# Patient Record
Sex: MALE | Race: WHITE | NOT HISPANIC OR LATINO | Employment: PART TIME | URBAN - METROPOLITAN AREA
[De-identification: names, ages, dates, MRNs, and addresses within clinical notes are randomized per-mention and may not be internally consistent; named-entity substitution may affect disease eponyms.]

---

## 2023-04-18 PROBLEM — M12.579 TRAUMATIC ARTHROPATHY OF ANKLE: Status: ACTIVE | Noted: 2023-04-18

## 2023-04-18 PROBLEM — K42.9 UMBILICAL HERNIA WITHOUT OBSTRUCTION AND WITHOUT GANGRENE: Status: ACTIVE | Noted: 2023-04-18

## 2023-04-18 PROBLEM — R60.9 EDEMA: Status: ACTIVE | Noted: 2023-04-18

## 2023-04-18 PROBLEM — R10.32 LEFT GROIN PAIN: Status: ACTIVE | Noted: 2023-04-18

## 2023-04-18 PROBLEM — M25.561 KNEE PAIN, RIGHT: Status: ACTIVE | Noted: 2023-04-18

## 2023-04-18 PROBLEM — R68.89 FLU-LIKE SYMPTOMS: Status: ACTIVE | Noted: 2023-04-18

## 2023-04-18 PROBLEM — M54.9 BACK PAIN: Status: ACTIVE | Noted: 2023-04-18

## 2023-04-18 PROBLEM — I82.402 ACUTE THROMBOEMBOLISM OF DEEP VEINS OF LEFT LOWER EXTREMITY (MULTI): Status: ACTIVE | Noted: 2023-04-18

## 2023-04-18 PROBLEM — I82.409 DVT (DEEP VENOUS THROMBOSIS) (MULTI): Status: ACTIVE | Noted: 2023-04-18

## 2023-04-18 PROBLEM — M17.10 PRIMARY LOCALIZED OSTEOARTHRITIS OF KNEE: Status: ACTIVE | Noted: 2023-04-18

## 2023-04-18 PROBLEM — M75.81 ROTATOR CUFF TENDONITIS, RIGHT: Status: ACTIVE | Noted: 2023-04-18

## 2023-04-18 PROBLEM — S89.90XA KNEE INJURY: Status: ACTIVE | Noted: 2023-04-18

## 2023-04-18 PROBLEM — R10.32 LEFT LOWER QUADRANT PAIN: Status: ACTIVE | Noted: 2023-04-18

## 2023-04-18 PROBLEM — M23.90 INTERNAL DERANGEMENT OF KNEE: Status: ACTIVE | Noted: 2023-04-18

## 2023-04-18 PROBLEM — J40 BRONCHITIS: Status: ACTIVE | Noted: 2023-04-18

## 2023-04-18 PROBLEM — K40.90 LEFT INGUINAL HERNIA: Status: ACTIVE | Noted: 2023-04-18

## 2023-04-18 PROBLEM — J30.9 ALLERGIC RHINITIS: Status: ACTIVE | Noted: 2023-04-18

## 2023-04-18 PROBLEM — I82.90 VENOUS THROMBOSIS: Status: ACTIVE | Noted: 2023-04-18

## 2023-04-18 PROBLEM — L30.9 ECZEMA: Status: ACTIVE | Noted: 2023-04-18

## 2023-04-18 PROBLEM — M75.21 BICEPS TENDINITIS OF RIGHT SHOULDER: Status: ACTIVE | Noted: 2023-04-18

## 2023-04-18 PROBLEM — S82.143D: Status: ACTIVE | Noted: 2023-04-18

## 2023-04-18 PROBLEM — Z98.52 STATUS POST VASECTOMY: Status: ACTIVE | Noted: 2023-04-18

## 2023-04-18 PROBLEM — H60.90 OTITIS EXTERNA: Status: ACTIVE | Noted: 2023-04-18

## 2023-04-18 PROBLEM — R29.818 SUSPECTED SLEEP APNEA: Status: ACTIVE | Noted: 2023-04-18

## 2023-04-18 PROBLEM — L40.9 PSORIASIS: Status: ACTIVE | Noted: 2023-04-18

## 2023-04-18 PROBLEM — G40.909 SEIZURE DISORDER (MULTI): Status: ACTIVE | Noted: 2023-04-18

## 2023-04-18 PROBLEM — E66.01 MORBID OBESITY (MULTI): Status: ACTIVE | Noted: 2023-04-18

## 2023-04-18 PROBLEM — M76.70 PERONEAL TENDONITIS: Status: ACTIVE | Noted: 2023-04-18

## 2023-04-18 PROBLEM — K21.9 GERD (GASTROESOPHAGEAL REFLUX DISEASE): Status: ACTIVE | Noted: 2023-04-18

## 2023-04-18 PROBLEM — S83.419A SPRAIN OF MEDIAL COLLATERAL LIGAMENT OF KNEE: Status: ACTIVE | Noted: 2023-04-18

## 2023-04-18 PROBLEM — M17.0 PRIMARY OSTEOARTHRITIS OF BOTH KNEES: Status: ACTIVE | Noted: 2023-04-18

## 2023-04-18 PROBLEM — A60.00 GENITAL HERPES: Status: ACTIVE | Noted: 2023-04-18

## 2023-04-18 PROBLEM — R21 RASH: Status: ACTIVE | Noted: 2023-04-18

## 2023-04-18 PROBLEM — M25.511 PAIN IN RIGHT SHOULDER: Status: ACTIVE | Noted: 2023-04-18

## 2023-04-18 PROBLEM — T81.30XA WOUND DEHISCENCE: Status: ACTIVE | Noted: 2023-04-18

## 2023-04-18 PROBLEM — M94.269 CHONDROMALACIA OF KNEE: Status: ACTIVE | Noted: 2023-04-18

## 2023-04-18 PROBLEM — D68.59 HYPERCOAGULATION SYNDROME (MULTI): Status: ACTIVE | Noted: 2023-04-18

## 2023-04-18 PROBLEM — M25.579 ANKLE PAIN: Status: ACTIVE | Noted: 2023-04-18

## 2024-01-08 DIAGNOSIS — K21.9 GASTROESOPHAGEAL REFLUX DISEASE, UNSPECIFIED WHETHER ESOPHAGITIS PRESENT: Primary | ICD-10-CM

## 2024-01-08 RX ORDER — PANTOPRAZOLE SODIUM 40 MG/1
40 TABLET, DELAYED RELEASE ORAL DAILY
Qty: 90 TABLET | Refills: 1 | Status: SHIPPED | OUTPATIENT
Start: 2024-01-08

## 2024-01-08 NOTE — TELEPHONE ENCOUNTER
Rx Refill Request Telephone Encounter    Name:  Ernie Porter  :  [id number]  Medication Name:   Requested Prescriptions     Pending Prescriptions Disp Refills    pantoprazole (ProtoNix) 40 mg EC tablet 90 tablet 1     Sig: Take 1 tablet (40 mg) by mouth once daily.   Specific Pharmacy location:    [facility]/pharmacy #[id number] [place] [address] AT [address] OF [address] [place] [address]  Phone: [phone] Fax: [phone]  Date of last appointment:  10/19/2022 w/   Date of next appointment:  None scheduled  Best number to reach patient:  [phone] (home) [phone] (work)

## 2024-01-16 ENCOUNTER — HOSPITAL ENCOUNTER (EMERGENCY)
Facility: HOSPITAL | Age: 39
Discharge: HOME | End: 2024-01-16
Attending: STUDENT IN AN ORGANIZED HEALTH CARE EDUCATION/TRAINING PROGRAM
Payer: COMMERCIAL

## 2024-01-16 ENCOUNTER — APPOINTMENT (OUTPATIENT)
Dept: RADIOLOGY | Facility: HOSPITAL | Age: 39
End: 2024-01-16
Payer: COMMERCIAL

## 2024-01-16 VITALS
HEIGHT: 70 IN | DIASTOLIC BLOOD PRESSURE: 81 MMHG | RESPIRATION RATE: 18 BRPM | TEMPERATURE: 97.3 F | BODY MASS INDEX: 42.95 KG/M2 | WEIGHT: 300 LBS | HEART RATE: 66 BPM | OXYGEN SATURATION: 97 % | SYSTOLIC BLOOD PRESSURE: 141 MMHG

## 2024-01-16 DIAGNOSIS — Z04.1 EXAM FOLLOWING MVC (MOTOR VEHICLE COLLISION), NO APPARENT INJURY: ICD-10-CM

## 2024-01-16 DIAGNOSIS — S16.1XXA CERVICAL STRAIN, ACUTE, INITIAL ENCOUNTER: Primary | ICD-10-CM

## 2024-01-16 PROCEDURE — 99285 EMERGENCY DEPT VISIT HI MDM: CPT

## 2024-01-16 PROCEDURE — 96372 THER/PROPH/DIAG INJ SC/IM: CPT

## 2024-01-16 PROCEDURE — 99285 EMERGENCY DEPT VISIT HI MDM: CPT | Mod: 25 | Performed by: STUDENT IN AN ORGANIZED HEALTH CARE EDUCATION/TRAINING PROGRAM

## 2024-01-16 PROCEDURE — 99284 EMERGENCY DEPT VISIT MOD MDM: CPT | Performed by: STUDENT IN AN ORGANIZED HEALTH CARE EDUCATION/TRAINING PROGRAM

## 2024-01-16 PROCEDURE — 72125 CT NECK SPINE W/O DYE: CPT | Performed by: RADIOLOGY

## 2024-01-16 PROCEDURE — 70450 CT HEAD/BRAIN W/O DYE: CPT

## 2024-01-16 PROCEDURE — 2500000004 HC RX 250 GENERAL PHARMACY W/ HCPCS (ALT 636 FOR OP/ED)

## 2024-01-16 PROCEDURE — 70450 CT HEAD/BRAIN W/O DYE: CPT | Performed by: RADIOLOGY

## 2024-01-16 PROCEDURE — 72125 CT NECK SPINE W/O DYE: CPT

## 2024-01-16 RX ORDER — ORPHENADRINE CITRATE 100 MG/1
100 TABLET, EXTENDED RELEASE ORAL 2 TIMES DAILY PRN
Qty: 14 TABLET | Refills: 0 | Status: SHIPPED | OUTPATIENT
Start: 2024-01-16

## 2024-01-16 RX ORDER — ACETAMINOPHEN 325 MG/1
650 TABLET ORAL ONCE
Status: COMPLETED | OUTPATIENT
Start: 2024-01-16 | End: 2024-01-16

## 2024-01-16 RX ORDER — ORPHENADRINE CITRATE 30 MG/ML
60 INJECTION INTRAMUSCULAR; INTRAVENOUS ONCE
Status: COMPLETED | OUTPATIENT
Start: 2024-01-16 | End: 2024-01-16

## 2024-01-16 RX ORDER — LIDOCAINE 560 MG/1
1 PATCH PERCUTANEOUS; TOPICAL; TRANSDERMAL DAILY
Status: DISCONTINUED | OUTPATIENT
Start: 2024-01-16 | End: 2024-01-16

## 2024-01-16 RX ADMIN — ACETAMINOPHEN 650 MG: 325 TABLET ORAL at 14:51

## 2024-01-16 RX ADMIN — ORPHENADRINE CITRATE 60 MG: 60 INJECTION INTRAMUSCULAR; INTRAVENOUS at 14:52

## 2024-01-16 ASSESSMENT — PAIN SCALES - GENERAL
PAINLEVEL_OUTOF10: 6
PAINLEVEL_OUTOF10: 4
PAINLEVEL_OUTOF10: 5 - MODERATE PAIN

## 2024-01-16 ASSESSMENT — COLUMBIA-SUICIDE SEVERITY RATING SCALE - C-SSRS
2. HAVE YOU ACTUALLY HAD ANY THOUGHTS OF KILLING YOURSELF?: NO
1. IN THE PAST MONTH, HAVE YOU WISHED YOU WERE DEAD OR WISHED YOU COULD GO TO SLEEP AND NOT WAKE UP?: NO
6. HAVE YOU EVER DONE ANYTHING, STARTED TO DO ANYTHING, OR PREPARED TO DO ANYTHING TO END YOUR LIFE?: NO

## 2024-01-16 ASSESSMENT — LIFESTYLE VARIABLES
REASON UNABLE TO ASSESS: NO
HAVE YOU EVER FELT YOU SHOULD CUT DOWN ON YOUR DRINKING: NO
EVER FELT BAD OR GUILTY ABOUT YOUR DRINKING: NO
EVER HAD A DRINK FIRST THING IN THE MORNING TO STEADY YOUR NERVES TO GET RID OF A HANGOVER: NO
HAVE PEOPLE ANNOYED YOU BY CRITICIZING YOUR DRINKING: NO

## 2024-01-16 ASSESSMENT — PAIN - FUNCTIONAL ASSESSMENT
PAIN_FUNCTIONAL_ASSESSMENT: 0-10
PAIN_FUNCTIONAL_ASSESSMENT: 0-10

## 2024-01-16 NOTE — DISCHARGE INSTRUCTIONS
Use the prescribed medication as it is written, use caution as it is sedating.  Please follow with your primary care physician in the next 2 to 3 days for evaluation. Return to the emergency department for any new or worsening symptoms such as change in strength sensation vision or speech, chest pain, difficulty breathing, persistent vomiting or confusion.
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
## 2024-09-13 ENCOUNTER — OFFICE VISIT (OUTPATIENT)
Dept: PRIMARY CARE | Facility: CLINIC | Age: 39
End: 2024-09-13
Payer: COMMERCIAL

## 2024-09-13 ENCOUNTER — LAB (OUTPATIENT)
Dept: LAB | Facility: LAB | Age: 39
End: 2024-09-13
Payer: COMMERCIAL

## 2024-09-13 VITALS
BODY MASS INDEX: 43.1 KG/M2 | HEART RATE: 86 BPM | RESPIRATION RATE: 16 BRPM | HEIGHT: 69 IN | SYSTOLIC BLOOD PRESSURE: 108 MMHG | OXYGEN SATURATION: 97 % | DIASTOLIC BLOOD PRESSURE: 72 MMHG | TEMPERATURE: 98.2 F | WEIGHT: 291 LBS

## 2024-09-13 DIAGNOSIS — I83.813 VARICOSE VEINS OF BOTH LOWER EXTREMITIES WITH PAIN: ICD-10-CM

## 2024-09-13 DIAGNOSIS — Z86.718 HISTORY OF DVT (DEEP VEIN THROMBOSIS): ICD-10-CM

## 2024-09-13 DIAGNOSIS — Z00.00 ANNUAL PHYSICAL EXAM: Primary | ICD-10-CM

## 2024-09-13 DIAGNOSIS — R29.818 SUSPECTED SLEEP APNEA: ICD-10-CM

## 2024-09-13 DIAGNOSIS — K21.9 GASTROESOPHAGEAL REFLUX DISEASE, UNSPECIFIED WHETHER ESOPHAGITIS PRESENT: ICD-10-CM

## 2024-09-13 DIAGNOSIS — Z00.00 ANNUAL PHYSICAL EXAM: ICD-10-CM

## 2024-09-13 PROBLEM — H60.90 OTITIS EXTERNA: Status: RESOLVED | Noted: 2023-04-18 | Resolved: 2024-09-13

## 2024-09-13 PROBLEM — M54.9 BACK PAIN: Status: RESOLVED | Noted: 2023-04-18 | Resolved: 2024-09-13

## 2024-09-13 PROBLEM — R21 RASH: Status: RESOLVED | Noted: 2023-04-18 | Resolved: 2024-09-13

## 2024-09-13 PROBLEM — K42.9 UMBILICAL HERNIA: Status: RESOLVED | Noted: 2024-09-13 | Resolved: 2024-09-13

## 2024-09-13 PROBLEM — M25.561 KNEE PAIN, RIGHT: Status: RESOLVED | Noted: 2023-04-18 | Resolved: 2024-09-13

## 2024-09-13 PROBLEM — Z98.890 HISTORY OF SURGICAL PROCEDURE: Status: RESOLVED | Noted: 2024-09-13 | Resolved: 2024-09-13

## 2024-09-13 PROBLEM — R10.32 LEFT GROIN PAIN: Status: RESOLVED | Noted: 2023-04-18 | Resolved: 2024-09-13

## 2024-09-13 PROBLEM — Z98.52 STATUS POST VASECTOMY: Status: RESOLVED | Noted: 2023-04-18 | Resolved: 2024-09-13

## 2024-09-13 PROBLEM — M17.10 PRIMARY LOCALIZED OSTEOARTHRITIS OF KNEE: Status: RESOLVED | Noted: 2023-04-18 | Resolved: 2024-09-13

## 2024-09-13 PROBLEM — S82.143D: Status: RESOLVED | Noted: 2023-04-18 | Resolved: 2024-09-13

## 2024-09-13 PROBLEM — M75.81 ROTATOR CUFF TENDONITIS, RIGHT: Status: RESOLVED | Noted: 2023-04-18 | Resolved: 2024-09-13

## 2024-09-13 PROBLEM — S89.90XA KNEE INJURY: Status: RESOLVED | Noted: 2023-04-18 | Resolved: 2024-09-13

## 2024-09-13 PROBLEM — R10.32 LEFT LOWER QUADRANT PAIN: Status: RESOLVED | Noted: 2023-04-18 | Resolved: 2024-09-13

## 2024-09-13 PROBLEM — T81.30XA WOUND DEHISCENCE: Status: RESOLVED | Noted: 2023-04-18 | Resolved: 2024-09-13

## 2024-09-13 PROBLEM — F41.9 ANXIETY: Status: ACTIVE | Noted: 2024-09-13

## 2024-09-13 PROBLEM — F32.1 CURRENT MODERATE EPISODE OF MAJOR DEPRESSIVE DISORDER (MULTI): Status: ACTIVE | Noted: 2023-10-27

## 2024-09-13 PROBLEM — M76.70 PERONEAL TENDONITIS: Status: RESOLVED | Noted: 2023-04-18 | Resolved: 2024-09-13

## 2024-09-13 PROBLEM — R68.89 FLU-LIKE SYMPTOMS: Status: RESOLVED | Noted: 2023-04-18 | Resolved: 2024-09-13

## 2024-09-13 PROBLEM — S83.419A SPRAIN OF MEDIAL COLLATERAL LIGAMENT OF KNEE: Status: RESOLVED | Noted: 2023-04-18 | Resolved: 2024-09-13

## 2024-09-13 PROBLEM — M25.579 ANKLE PAIN: Status: RESOLVED | Noted: 2023-04-18 | Resolved: 2024-09-13

## 2024-09-13 PROBLEM — I82.402 ACUTE THROMBOEMBOLISM OF DEEP VEINS OF LEFT LOWER EXTREMITY (MULTI): Status: RESOLVED | Noted: 2023-04-18 | Resolved: 2024-09-13

## 2024-09-13 PROBLEM — M25.511 PAIN IN RIGHT SHOULDER: Status: RESOLVED | Noted: 2023-04-18 | Resolved: 2024-09-13

## 2024-09-13 PROBLEM — M12.579 TRAUMATIC ARTHROPATHY OF ANKLE: Status: RESOLVED | Noted: 2023-04-18 | Resolved: 2024-09-13

## 2024-09-13 PROBLEM — J40 BRONCHITIS: Status: RESOLVED | Noted: 2023-04-18 | Resolved: 2024-09-13

## 2024-09-13 PROBLEM — I82.90 VENOUS THROMBOSIS: Status: RESOLVED | Noted: 2023-04-18 | Resolved: 2024-09-13

## 2024-09-13 PROBLEM — M75.21 BICEPS TENDINITIS OF RIGHT SHOULDER: Status: RESOLVED | Noted: 2023-04-18 | Resolved: 2024-09-13

## 2024-09-13 PROBLEM — Z98.890 HISTORY OF ANKLE SURGERY: Status: RESOLVED | Noted: 2024-09-13 | Resolved: 2024-09-13

## 2024-09-13 LAB
ALBUMIN SERPL BCP-MCNC: 4.8 G/DL (ref 3.4–5)
ALP SERPL-CCNC: 54 U/L (ref 33–120)
ALT SERPL W P-5'-P-CCNC: 33 U/L (ref 10–52)
ANION GAP SERPL CALC-SCNC: 16 MMOL/L (ref 10–20)
AST SERPL W P-5'-P-CCNC: 26 U/L (ref 9–39)
BILIRUB SERPL-MCNC: 1.3 MG/DL (ref 0–1.2)
BUN SERPL-MCNC: 23 MG/DL (ref 6–23)
CALCIUM SERPL-MCNC: 10 MG/DL (ref 8.6–10.3)
CHLORIDE SERPL-SCNC: 103 MMOL/L (ref 98–107)
CHOLEST SERPL-MCNC: 171 MG/DL (ref 0–199)
CHOLESTEROL/HDL RATIO: 3.2
CO2 SERPL-SCNC: 24 MMOL/L (ref 21–32)
CREAT SERPL-MCNC: 1.08 MG/DL (ref 0.5–1.3)
EGFRCR SERPLBLD CKD-EPI 2021: >90 ML/MIN/1.73M*2
ERYTHROCYTE [DISTWIDTH] IN BLOOD BY AUTOMATED COUNT: 13 % (ref 11.5–14.5)
GLUCOSE SERPL-MCNC: 76 MG/DL (ref 74–99)
HCT VFR BLD AUTO: 48.8 % (ref 41–52)
HDLC SERPL-MCNC: 54.2 MG/DL
HGB BLD-MCNC: 15.9 G/DL (ref 13.5–17.5)
LDLC SERPL CALC-MCNC: 104 MG/DL
MCH RBC QN AUTO: 29.2 PG (ref 26–34)
MCHC RBC AUTO-ENTMCNC: 32.6 G/DL (ref 32–36)
MCV RBC AUTO: 90 FL (ref 80–100)
NON HDL CHOLESTEROL: 117 MG/DL (ref 0–149)
NRBC BLD-RTO: 0 /100 WBCS (ref 0–0)
PLATELET # BLD AUTO: 248 X10*3/UL (ref 150–450)
POTASSIUM SERPL-SCNC: 4.5 MMOL/L (ref 3.5–5.3)
PROT SERPL-MCNC: 7.4 G/DL (ref 6.4–8.2)
RBC # BLD AUTO: 5.44 X10*6/UL (ref 4.5–5.9)
SODIUM SERPL-SCNC: 138 MMOL/L (ref 136–145)
TRIGL SERPL-MCNC: 62 MG/DL (ref 0–149)
TSH SERPL-ACNC: 0.56 MIU/L (ref 0.44–3.98)
VLDL: 12 MG/DL (ref 0–40)
WBC # BLD AUTO: 11.7 X10*3/UL (ref 4.4–11.3)

## 2024-09-13 PROCEDURE — 86803 HEPATITIS C AB TEST: CPT

## 2024-09-13 PROCEDURE — 1036F TOBACCO NON-USER: CPT

## 2024-09-13 PROCEDURE — 85027 COMPLETE CBC AUTOMATED: CPT

## 2024-09-13 PROCEDURE — 80061 LIPID PANEL: CPT

## 2024-09-13 PROCEDURE — 99395 PREV VISIT EST AGE 18-39: CPT

## 2024-09-13 PROCEDURE — 80053 COMPREHEN METABOLIC PANEL: CPT

## 2024-09-13 PROCEDURE — 84443 ASSAY THYROID STIM HORMONE: CPT

## 2024-09-13 PROCEDURE — 3008F BODY MASS INDEX DOCD: CPT

## 2024-09-13 PROCEDURE — 36415 COLL VENOUS BLD VENIPUNCTURE: CPT

## 2024-09-13 RX ORDER — PANTOPRAZOLE SODIUM 40 MG/1
40 TABLET, DELAYED RELEASE ORAL DAILY
Qty: 90 TABLET | Refills: 1 | Status: SHIPPED | OUTPATIENT
Start: 2024-09-13

## 2024-09-13 RX ORDER — PREDNISONE 20 MG/1
20 TABLET ORAL DAILY
COMMUNITY
Start: 2023-03-30 | End: 2024-09-13 | Stop reason: WASHOUT

## 2024-09-13 RX ORDER — FLUOXETINE HYDROCHLORIDE 40 MG/1
1 CAPSULE ORAL
COMMUNITY
Start: 2024-08-10

## 2024-09-13 RX ORDER — PHENTERMINE HYDROCHLORIDE 37.5 MG/1
1 TABLET ORAL
COMMUNITY
Start: 2024-03-06 | End: 2024-09-13 | Stop reason: WASHOUT

## 2024-09-13 RX ORDER — HYDROXYZINE HYDROCHLORIDE 25 MG/1
1 TABLET, FILM COATED ORAL 2 TIMES DAILY PRN
COMMUNITY
Start: 2024-06-17 | End: 2024-09-13 | Stop reason: WASHOUT

## 2024-09-13 SDOH — ECONOMIC STABILITY: INCOME INSECURITY: IN THE LAST 12 MONTHS, WAS THERE A TIME WHEN YOU WERE NOT ABLE TO PAY THE MORTGAGE OR RENT ON TIME?: NO

## 2024-09-13 SDOH — ECONOMIC STABILITY: FOOD INSECURITY: WITHIN THE PAST 12 MONTHS, THE FOOD YOU BOUGHT JUST DIDN'T LAST AND YOU DIDN'T HAVE MONEY TO GET MORE.: NEVER TRUE

## 2024-09-13 SDOH — HEALTH STABILITY: PHYSICAL HEALTH: ON AVERAGE, HOW MANY DAYS PER WEEK DO YOU ENGAGE IN MODERATE TO STRENUOUS EXERCISE (LIKE A BRISK WALK)?: 5 DAYS

## 2024-09-13 SDOH — ECONOMIC STABILITY: TRANSPORTATION INSECURITY
IN THE PAST 12 MONTHS, HAS THE LACK OF TRANSPORTATION KEPT YOU FROM MEDICAL APPOINTMENTS OR FROM GETTING MEDICATIONS?: NO

## 2024-09-13 SDOH — ECONOMIC STABILITY: TRANSPORTATION INSECURITY
IN THE PAST 12 MONTHS, HAS LACK OF TRANSPORTATION KEPT YOU FROM MEETINGS, WORK, OR FROM GETTING THINGS NEEDED FOR DAILY LIVING?: NO

## 2024-09-13 SDOH — ECONOMIC STABILITY: FOOD INSECURITY: WITHIN THE PAST 12 MONTHS, YOU WORRIED THAT YOUR FOOD WOULD RUN OUT BEFORE YOU GOT MONEY TO BUY MORE.: NEVER TRUE

## 2024-09-13 SDOH — HEALTH STABILITY: PHYSICAL HEALTH: ON AVERAGE, HOW MANY MINUTES DO YOU ENGAGE IN EXERCISE AT THIS LEVEL?: 60 MIN

## 2024-09-13 ASSESSMENT — LIFESTYLE VARIABLES
HOW OFTEN DO YOU HAVE A DRINK CONTAINING ALCOHOL: MONTHLY OR LESS
SKIP TO QUESTIONS 9-10: 1
HOW OFTEN DO YOU HAVE SIX OR MORE DRINKS ON ONE OCCASION: NEVER
HOW MANY STANDARD DRINKS CONTAINING ALCOHOL DO YOU HAVE ON A TYPICAL DAY: 1 OR 2
AUDIT-C TOTAL SCORE: 1

## 2024-09-13 ASSESSMENT — SOCIAL DETERMINANTS OF HEALTH (SDOH)
HOW OFTEN DO YOU GET TOGETHER WITH FRIENDS OR RELATIVES?: ONCE A WEEK
WITHIN THE LAST YEAR, HAVE YOU BEEN AFRAID OF YOUR PARTNER OR EX-PARTNER?: NO
HOW OFTEN DO YOU ATTEND CHURCH OR RELIGIOUS SERVICES?: NEVER
DO YOU BELONG TO ANY CLUBS OR ORGANIZATIONS SUCH AS CHURCH GROUPS UNIONS, FRATERNAL OR ATHLETIC GROUPS, OR SCHOOL GROUPS?: NO
IN THE PAST 12 MONTHS, HAS THE ELECTRIC, GAS, OIL, OR WATER COMPANY THREATENED TO SHUT OFF SERVICE IN YOUR HOME?: NO
HOW HARD IS IT FOR YOU TO PAY FOR THE VERY BASICS LIKE FOOD, HOUSING, MEDICAL CARE, AND HEATING?: NOT VERY HARD
HOW OFTEN DO YOU ATTENT MEETINGS OF THE CLUB OR ORGANIZATION YOU BELONG TO?: NEVER
WITHIN THE LAST YEAR, HAVE YOU BEEN KICKED, HIT, SLAPPED, OR OTHERWISE PHYSICALLY HURT BY YOUR PARTNER OR EX-PARTNER?: NO
IN A TYPICAL WEEK, HOW MANY TIMES DO YOU TALK ON THE PHONE WITH FAMILY, FRIENDS, OR NEIGHBORS?: MORE THAN THREE TIMES A WEEK
WITHIN THE LAST YEAR, HAVE TO BEEN RAPED OR FORCED TO HAVE ANY KIND OF SEXUAL ACTIVITY BY YOUR PARTNER OR EX-PARTNER?: NO
WITHIN THE LAST YEAR, HAVE YOU BEEN HUMILIATED OR EMOTIONALLY ABUSED IN OTHER WAYS BY YOUR PARTNER OR EX-PARTNER?: NO

## 2024-09-13 ASSESSMENT — PATIENT HEALTH QUESTIONNAIRE - PHQ9
2. FEELING DOWN, DEPRESSED OR HOPELESS: NOT AT ALL
1. LITTLE INTEREST OR PLEASURE IN DOING THINGS: NOT AT ALL
SUM OF ALL RESPONSES TO PHQ9 QUESTIONS 1 & 2: 0

## 2024-09-13 NOTE — ASSESSMENT & PLAN NOTE
Stable.  Refill sent.  Orders:    pantoprazole (ProtoNix) 40 mg EC tablet; Take 1 tablet (40 mg) by mouth once daily.

## 2024-09-13 NOTE — PROGRESS NOTES
I reviewed with the resident the medical history and the resident’s findings on physical examination.  I discussed with the resident the patient’s diagnosis and concur with the treatment plan as documented in the resident note.     Brian Guerra, DO

## 2024-09-13 NOTE — PROGRESS NOTES
Subjective   Ernie Porter is a 39 y.o. male who presents for Annual Exam.  Here to establish care.     Needs refills.     Is on xarelto but has been out for a month.     Gerd: doing well on pantoprazole 20mg.     Anxiety: Sees psych. Doing well on prozac 40mg.     Fatigue: endorses snoring, wife says he stops breathing, poorly rested throughout the day.    Varicose veins: He endorses he has bilateral varicose veins that do cause pain from time to time.  He denies thrombophlebitis episodes.  He states they have been there for couple years now.  He states that his mom has had stripping procedure for bilateral varicose veins as well.        MedHx: Reviewed and in chart    Meds: Reviewed and in chart    SurgHx: Reviewed and in chart    FamHx: Reviewed and in chart    SocHx: Lives at home with wife and 5 kids s  Substances: marijuana consistently, no other illicit drugs.   Diet: Paleo diet, losing weight right now. Down 15lbs  Exercise: Goes to the gym regularly with lifting and cardio      Active Problem List  Patient Active Problem List   Diagnosis    Acute thromboembolism of deep veins of left lower extremity (Multi)    DVT (deep venous thrombosis) (Multi)    Venous thrombosis    Allergic rhinitis    Ankle pain    Back pain    Biceps tendinitis of right shoulder    Peroneal tendonitis    Bronchitis    Chondromalacia of knee    Internal derangement of knee    Primary localized osteoarthritis of knee    Closed fracture of tibial plateau with routine healing, subsequent encounter    Eczema    Edema    Flu-like symptoms    Genital herpes    GERD (gastroesophageal reflux disease)    Hypercoagulation syndrome (Multi)    Knee injury    Primary osteoarthritis of both knees    Knee pain, right    Left groin pain    Left inguinal hernia    Left lower quadrant pain    Morbid obesity (Multi)    Otitis externa    Pain in right shoulder    Psoriasis    Rash    Rotator cuff tendonitis, right    Seizure disorder (Multi)    Sprain  "of medial collateral ligament of knee    Status post vasectomy    Suspected sleep apnea    Traumatic arthropathy of ankle    Umbilical hernia without obstruction and without gangrene    Wound dehiscence       Comprehensive Medical/Surgical/Family History  Past Medical History:   Diagnosis Date    Other specified health status 06/14/2016    Known health problems: none     Past Surgical History:   Procedure Laterality Date    HERNIA REPAIR  06/14/2016    Inguinal Hernia Repair    LEG SURGERY  07/02/2015    Treatment Of Lower Leg Fracture       Social History  Social History     Social History Narrative    Not on file       Allergies and Medications  Patient has no known allergies.  Current Outpatient Medications on File Prior to Visit   Medication Sig Dispense Refill    pantoprazole (ProtoNix) 40 mg EC tablet Take 1 tablet (40 mg) by mouth once daily. 90 tablet 1    Dupixent Pen 300 mg/2 mL injection       loratadine (Claritin) 10 mg tablet Take 1 tablet (10 mg) by mouth once daily.      neomycin-polymyxin-HC (Cortisporin) 3.5-10,000-1 mg/mL-unit/mL-% otic suspension Administer into affected ear(s) 4 times a day.      orphenadrine (Norflex) 100 mg 12 hr tablet Take 1 tablet (100 mg) by mouth 2 times a day as needed for muscle spasms for up to 14 doses. Do not crush, chew, or split. Don't combine with other sedating medications. Don't drive, operate heavy machinery until you know how this medication makes you feel. (Patient not taking: Reported on 9/13/2024) 14 tablet 0    rivaroxaban (Xarelto) 20 mg tablet Take 1 tablet (20 mg) by mouth once daily.       No current facility-administered medications on file prior to visit.         /72 (BP Location: Right arm, Patient Position: Sitting, BP Cuff Size: Large adult)   Pulse 86   Temp 36.8 °C (98.2 °F)   Resp 16   Ht 1.753 m (5' 9\")   Wt 132 kg (291 lb)   SpO2 97%   BMI 42.97 kg/m²   Objective   Physical Exam  Constitutional:       General: He is not in acute " distress.     Appearance: Normal appearance. He is not ill-appearing.   HENT:      Head: Normocephalic and atraumatic.      Right Ear: Tympanic membrane and ear canal normal. There is no impacted cerumen.      Left Ear: Tympanic membrane and ear canal normal. There is no impacted cerumen.      Mouth/Throat:      Mouth: Mucous membranes are moist.      Pharynx: Oropharynx is clear.      Comments: Mallampati grade II  Eyes:      Extraocular Movements: Extraocular movements intact.      Pupils: Pupils are equal, round, and reactive to light.   Cardiovascular:      Rate and Rhythm: Normal rate and regular rhythm.      Pulses:           Posterior tibial pulses are 2+ on the right side and 2+ on the left side.      Heart sounds: Normal heart sounds.      Comments: B/l LE varicose veins, most visibly prominent on the R posterior calf   Pulmonary:      Effort: Pulmonary effort is normal. No respiratory distress.      Breath sounds: Normal breath sounds.   Musculoskeletal:         General: Normal range of motion.      Cervical back: Normal range of motion and neck supple.      Right lower leg: No edema.      Left lower leg: No edema.   Lymphadenopathy:      Cervical: No cervical adenopathy.   Skin:     General: Skin is warm and dry.   Neurological:      General: No focal deficit present.      Mental Status: He is alert. Mental status is at baseline.   Psychiatric:         Mood and Affect: Mood normal.         Thought Content: Thought content normal.         Assessment & Plan  Annual physical exam  Here to establish care.  Comprehensive history reviewed as above.  Will obtain labs as ordered below.  Orders:    Comprehensive Metabolic Panel; Future    CBC; Future    TSH with reflex to Free T4 if abnormal; Future    Lipid Panel; Future    Hepatitis C antibody; Future    History of DVT (deep vein thrombosis)  I personally reviewed outside notes from hematology/oncology.  It appears that he had his first DVT in 2020 and was  started on DOAC.  He then had another DVT in 2022 and was recommended to be on long-term DOAC therapy with no clear end date.  He had some hypercoagulable workup as well which appears to be negative.  He has not since followed up with hematology oncology.  Given this history, combined with the fact that he has varicose veins present on exam today that are causing pain, I do believe he would be best evaluated by vascular medicine for both issues.  I have sent refills for Xarelto in the interim.  Orders:    rivaroxaban (Xarelto) 20 mg tablet; Take 1 tablet (20 mg) by mouth once daily.    Referral to Vascular Medicine; Future    Gastroesophageal reflux disease, unspecified whether esophagitis present  Stable.  Refill sent.  Orders:    pantoprazole (ProtoNix) 40 mg EC tablet; Take 1 tablet (40 mg) by mouth once daily.    Varicose veins of both lower extremities with pain  See above.  He does have bilateral varicose veins that are present.  They are causing pain.  I think that vascular medicine should evaluate him for potential treatment modalities such as ablation or stripping.  This may or may not be contributory to his history of DVTs as well.  Orders:    rivaroxaban (Xarelto) 20 mg tablet; Take 1 tablet (20 mg) by mouth once daily.    Referral to Vascular Medicine; Future    Suspected sleep apnea  STOP-BANG today is 5.  He is high risk for sleep apnea.  Sleep study ordered.  Orders:    Home sleep apnea test (HSAT); Future      Follow-up in 1 year or sooner as needed.  Will consider sooner follow-up if any abnormalities are noted on the ordered lab work.    Jeffrey Rosales D.O.   Family Medicine PGY-2, [facility]  09/13/24

## 2024-09-13 NOTE — PATIENT INSTRUCTIONS
It was nice seeing you in the office today.  Sign up for [employer] to get results instantly.  Obtain labs/imaging as discussed during our visit.   Follow up 1 year or sooner as needed.   Call the office: [phone] for questions or concerns.  Please allow 48-72 hours for medication refills.    Jeffrey Rosales D.O.   Family Medicine PGY-2, [facility]  09/13/24

## 2024-09-13 NOTE — ASSESSMENT & PLAN NOTE
STOP-BANG today is 5.  He is high risk for sleep apnea.  Sleep study ordered.  Orders:    Home sleep apnea test (HSAT); Future

## 2024-09-14 LAB — HCV AB SER QL: NONREACTIVE

## 2024-10-17 ENCOUNTER — CLINICAL SUPPORT (OUTPATIENT)
Dept: SLEEP MEDICINE | Facility: CLINIC | Age: 39
End: 2024-10-17
Payer: COMMERCIAL

## 2024-10-17 DIAGNOSIS — R29.818 SUSPECTED SLEEP APNEA: ICD-10-CM

## 2024-10-18 NOTE — PROGRESS NOTES
Type of Study: HOME SLEEP STUDY - NOMAD     The patient received equipment and instructions for use of the [employer] [facility] Nomad HSAT device. The patient was instructed how to apply the effort belts, cannula, thermistor. It was also explained how the Nomad and oximeter components work.  The patient was asked to record their sleep for an 8-hour period.     The patient was informed of their responsibility for the device and acknowledged this by signing the HSAT device contract. The patient was asked to return the device on 10/18/2024 between the hours of 06:30 am -06:30 pm to the [facility].     The patient was instructed to call 911 as usual for any medical- emergencies while at home.  The patient was also given a phone number for troubleshooting when using the device in case there were additional questions.

## 2024-10-19 ENCOUNTER — APPOINTMENT (OUTPATIENT)
Dept: RADIOLOGY | Facility: HOSPITAL | Age: 39
End: 2024-10-19
Payer: COMMERCIAL

## 2024-10-19 ENCOUNTER — HOSPITAL ENCOUNTER (EMERGENCY)
Facility: HOSPITAL | Age: 39
Discharge: HOME | End: 2024-10-19
Attending: STUDENT IN AN ORGANIZED HEALTH CARE EDUCATION/TRAINING PROGRAM
Payer: COMMERCIAL

## 2024-10-19 VITALS
TEMPERATURE: 96.6 F | SYSTOLIC BLOOD PRESSURE: 114 MMHG | HEART RATE: 72 BPM | BODY MASS INDEX: 40.8 KG/M2 | DIASTOLIC BLOOD PRESSURE: 63 MMHG | HEIGHT: 70 IN | OXYGEN SATURATION: 99 % | RESPIRATION RATE: 18 BRPM | WEIGHT: 285 LBS

## 2024-10-19 DIAGNOSIS — M54.9 BACK PAIN, UNSPECIFIED BACK LOCATION, UNSPECIFIED BACK PAIN LATERALITY, UNSPECIFIED CHRONICITY: Primary | ICD-10-CM

## 2024-10-19 LAB
ALBUMIN SERPL BCP-MCNC: 4 G/DL (ref 3.4–5)
ALP SERPL-CCNC: 52 U/L (ref 33–120)
ALT SERPL W P-5'-P-CCNC: 23 U/L (ref 10–52)
ANION GAP SERPL CALC-SCNC: 11 MMOL/L (ref 10–20)
APPEARANCE UR: CLEAR
AST SERPL W P-5'-P-CCNC: 16 U/L (ref 9–39)
BASOPHILS # BLD AUTO: 0.08 X10*3/UL (ref 0–0.1)
BASOPHILS NFR BLD AUTO: 0.7 %
BILIRUB SERPL-MCNC: 0.7 MG/DL (ref 0–1.2)
BILIRUB UR STRIP.AUTO-MCNC: NEGATIVE MG/DL
BUN SERPL-MCNC: 18 MG/DL (ref 6–23)
CALCIUM SERPL-MCNC: 8.9 MG/DL (ref 8.6–10.3)
CHLORIDE SERPL-SCNC: 106 MMOL/L (ref 98–107)
CO2 SERPL-SCNC: 27 MMOL/L (ref 21–32)
COLOR UR: ABNORMAL
CREAT SERPL-MCNC: 0.96 MG/DL (ref 0.5–1.3)
EGFRCR SERPLBLD CKD-EPI 2021: >90 ML/MIN/1.73M*2
EOSINOPHIL # BLD AUTO: 0.34 X10*3/UL (ref 0–0.7)
EOSINOPHIL NFR BLD AUTO: 2.9 %
ERYTHROCYTE [DISTWIDTH] IN BLOOD BY AUTOMATED COUNT: 12.6 % (ref 11.5–14.5)
GLUCOSE SERPL-MCNC: 102 MG/DL (ref 74–99)
GLUCOSE UR STRIP.AUTO-MCNC: NORMAL MG/DL
HCT VFR BLD AUTO: 45.6 % (ref 41–52)
HGB BLD-MCNC: 14.9 G/DL (ref 13.5–17.5)
HOLD SPECIMEN: NORMAL
HOLD SPECIMEN: NORMAL
IMM GRANULOCYTES # BLD AUTO: 0.04 X10*3/UL (ref 0–0.7)
IMM GRANULOCYTES NFR BLD AUTO: 0.3 % (ref 0–0.9)
KETONES UR STRIP.AUTO-MCNC: NEGATIVE MG/DL
LEUKOCYTE ESTERASE UR QL STRIP.AUTO: NEGATIVE
LIPASE SERPL-CCNC: 30 U/L (ref 9–82)
LYMPHOCYTES # BLD AUTO: 3.7 X10*3/UL (ref 1.2–4.8)
LYMPHOCYTES NFR BLD AUTO: 31.5 %
MCH RBC QN AUTO: 29.2 PG (ref 26–34)
MCHC RBC AUTO-ENTMCNC: 32.7 G/DL (ref 32–36)
MCV RBC AUTO: 89 FL (ref 80–100)
MONOCYTES # BLD AUTO: 0.78 X10*3/UL (ref 0.1–1)
MONOCYTES NFR BLD AUTO: 6.6 %
NEUTROPHILS # BLD AUTO: 6.82 X10*3/UL (ref 1.2–7.7)
NEUTROPHILS NFR BLD AUTO: 58 %
NITRITE UR QL STRIP.AUTO: NEGATIVE
NRBC BLD-RTO: 0 /100 WBCS (ref 0–0)
PH UR STRIP.AUTO: 6.5 [PH]
PLATELET # BLD AUTO: 216 X10*3/UL (ref 150–450)
POTASSIUM SERPL-SCNC: 4 MMOL/L (ref 3.5–5.3)
PROT SERPL-MCNC: 6.7 G/DL (ref 6.4–8.2)
PROT UR STRIP.AUTO-MCNC: NEGATIVE MG/DL
RBC # BLD AUTO: 5.1 X10*6/UL (ref 4.5–5.9)
RBC # UR STRIP.AUTO: NEGATIVE /UL
SARS-COV-2 RNA RESP QL NAA+PROBE: NOT DETECTED
SODIUM SERPL-SCNC: 140 MMOL/L (ref 136–145)
SP GR UR STRIP.AUTO: 1.03
UROBILINOGEN UR STRIP.AUTO-MCNC: ABNORMAL MG/DL
WBC # BLD AUTO: 11.8 X10*3/UL (ref 4.4–11.3)

## 2024-10-19 PROCEDURE — 2550000001 HC RX 255 CONTRASTS: Performed by: STUDENT IN AN ORGANIZED HEALTH CARE EDUCATION/TRAINING PROGRAM

## 2024-10-19 PROCEDURE — 99284 EMERGENCY DEPT VISIT MOD MDM: CPT | Mod: 25

## 2024-10-19 PROCEDURE — 81003 URINALYSIS AUTO W/O SCOPE: CPT

## 2024-10-19 PROCEDURE — 36415 COLL VENOUS BLD VENIPUNCTURE: CPT

## 2024-10-19 PROCEDURE — 96374 THER/PROPH/DIAG INJ IV PUSH: CPT | Mod: 59

## 2024-10-19 PROCEDURE — 99285 EMERGENCY DEPT VISIT HI MDM: CPT | Performed by: STUDENT IN AN ORGANIZED HEALTH CARE EDUCATION/TRAINING PROGRAM

## 2024-10-19 PROCEDURE — 80053 COMPREHEN METABOLIC PANEL: CPT

## 2024-10-19 PROCEDURE — 2500000004 HC RX 250 GENERAL PHARMACY W/ HCPCS (ALT 636 FOR OP/ED)

## 2024-10-19 PROCEDURE — 87635 SARS-COV-2 COVID-19 AMP PRB: CPT

## 2024-10-19 PROCEDURE — 85025 COMPLETE CBC W/AUTO DIFF WBC: CPT

## 2024-10-19 PROCEDURE — 83690 ASSAY OF LIPASE: CPT

## 2024-10-19 PROCEDURE — 74177 CT ABD & PELVIS W/CONTRAST: CPT | Performed by: RADIOLOGY

## 2024-10-19 PROCEDURE — 74177 CT ABD & PELVIS W/CONTRAST: CPT

## 2024-10-19 RX ORDER — LIDOCAINE 50 MG/G
1 PATCH TOPICAL DAILY
Qty: 10 PATCH | Refills: 0 | Status: SHIPPED | OUTPATIENT
Start: 2024-10-19

## 2024-10-19 RX ORDER — KETOROLAC TROMETHAMINE 15 MG/ML
15 INJECTION, SOLUTION INTRAMUSCULAR; INTRAVENOUS ONCE
Status: COMPLETED | OUTPATIENT
Start: 2024-10-19 | End: 2024-10-19

## 2024-10-19 RX ORDER — METHOCARBAMOL 500 MG/1
500 TABLET, FILM COATED ORAL 2 TIMES DAILY
Qty: 20 TABLET | Refills: 0 | Status: SHIPPED | OUTPATIENT
Start: 2024-10-19 | End: 2024-10-29

## 2024-10-19 RX ADMIN — KETOROLAC TROMETHAMINE 15 MG: 15 INJECTION, SOLUTION INTRAMUSCULAR; INTRAVENOUS at 21:24

## 2024-10-19 RX ADMIN — IOHEXOL 75 ML: 350 INJECTION, SOLUTION INTRAVENOUS at 21:45

## 2024-10-19 ASSESSMENT — PAIN DESCRIPTION - LOCATION: LOCATION: BACK

## 2024-10-19 ASSESSMENT — LIFESTYLE VARIABLES
HAVE PEOPLE ANNOYED YOU BY CRITICIZING YOUR DRINKING: NO
EVER HAD A DRINK FIRST THING IN THE MORNING TO STEADY YOUR NERVES TO GET RID OF A HANGOVER: NO
HAVE YOU EVER FELT YOU SHOULD CUT DOWN ON YOUR DRINKING: NO
TOTAL SCORE: 0
EVER FELT BAD OR GUILTY ABOUT YOUR DRINKING: NO

## 2024-10-19 ASSESSMENT — PAIN SCALES - GENERAL
PAINLEVEL_OUTOF10: 2
PAINLEVEL_OUTOF10: 7

## 2024-10-19 ASSESSMENT — PAIN - FUNCTIONAL ASSESSMENT
PAIN_FUNCTIONAL_ASSESSMENT: 0-10
PAIN_FUNCTIONAL_ASSESSMENT: 0-10

## 2024-10-19 ASSESSMENT — PAIN DESCRIPTION - ORIENTATION: ORIENTATION: LOWER

## 2024-10-19 ASSESSMENT — PAIN DESCRIPTION - PAIN TYPE: TYPE: ACUTE PAIN

## 2024-10-20 LAB — HOLD SPECIMEN: NORMAL

## 2024-10-20 NOTE — ED PROVIDER NOTES
Emergency Department Provider Note        History of Present Illness     History provided by: Patient and Significant Other  Limitations to History: None  External Records Reviewed with Brief Summary:  Medical chart review    HPI:  Ernie Porter is a 39 y.o. male who arrives to the emergency department with a chief complaint of lower back pain for the last 5 days.  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  However the patient states he has not had a traumatic event to cause this pain.  He additionally states the pain began on his left flank and has started radiating into his left abdomen.  He states he also has bilateral back pain.  He is accompanied by his significant other who confirms the story and states that he is on Eliquis for history of DVTs Protonix for GERD.  Patient denies surgical history, states he does use daily marijuana, denies alcohol or tobacco use.    Physical Exam   Triage vitals:  T 35.9 °C (96.6 °F)  HR 83  /69  RR 20  O2 97 % None (Room air)    General: Awake, alert, in no acute distress  Eyes: Gaze conjugate.  No scleral icterus or injection  HENT: Normo-cephalic, atraumatic. No stridor  CV: Regular rate, regular rhythm. Radial pulses 2+ bilaterally  Resp: Breathing non-labored, speaking in full sentences.  Clear to auscultation bilaterally  GI: Soft, non-distended, non-tender, very mild tenderness on strong palpation of left costovertebral angle. No rebound or guarding.  : Deferred  MSK/Extremities: No gross bony deformities. Moving all extremities.  Mild tenderness to palpation paravertebrally.  Skin: Warm. Appropriate color  Neuro: Alert. Oriented. Face symmetric. Speech is fluent.  Gross strength and sensation intact in b/l UE and LEs  Psych: Appropriate mood and affect    Medical Decision Making & ED Course   Medical Decision Makin y.o. male to the emergency department with a chief complaint  of lower back pain for the last 5 days including the left lower flank pain radiating to the inguinal region with no hematuria no dysuria.  Patient denies nausea or vomiting.  Lower back pain sciatica/lumbago is high on differential however we will rule out renal or nephrolithiasis or diverticulosis or diverticulitis first, patient given Toradol, CBC, CMP, lipase, CT abdomen pelvis, urinalysis ordered for evaluation.  Please see ED course for further medical decision making.  ----      Differential diagnoses considered include but are not limited to: Lumbar go, lower back strain, renal lithiasis, urinary tract infection, COVID, ureterolithiasis, bladder stone     Social Determinants of Health which Significantly Impact Care: None identified   Independent Result Review and Interpretation: Relevant laboratory and radiographic results were reviewed and independently interpreted by myself.  As necessary, they are commented on in the ED Course.    Chronic conditions affecting the patient's care: As documented above in [facility]    The patient was discussed with the following consultants/services: None    Care Considerations: As documented above in [facility]    ED Course:  ED Course as of 10/21/24 1725   Sat Oct 19, 2024   2018 Patient has left-sided flank pain radiating to the left abdomen and left inguinal region.  CT of the abdomen pelvis with IV contrast, CBC CMP lipase urinalysis ordered for evaluation rule out renal lithiasis.  Also on differential is lumbar go and musculoskeletal pain however we can address these after treating and evaluating for potential ureterolithiasis.  Patient denies nausea or vomiting, Zofran withheld.  Toradol given for pain management CT abdomen pelvis ordered to rule out renal lithiasis ureterolithiasis, acute intra-abdominal or pelvic anatomical pathology or costovertebral angle pathology. [PV]   2026 Genitourinary exam performed with nurse Hdz at bedside.  Normal bilateral descended testicles  with nontender examination.  Normal cremasteric reflex.  Normal circumcised penis with no lesions or discharge.  No inguinal lymphadenopathy or hernia. [TL]   2327 CT abdomen pelvis with no acute abdominal or pelvic process.  Cholelithiasis and nonspecific gallbladder wall thickening, gallbladder nondistended no significant pericholecystic inflammatory changes.  Cholecystitis is unlikely. [CL]      ED Course User Index  [CL] Luis Coy DO  [PV] Julio Brown DO  [TL] Cl Ortiz DO         Diagnoses as of 10/21/24 1725   Back pain, unspecified back location, unspecified back pain laterality, unspecified chronicity     Disposition   Patient was signed out to Dr. Coy at 2100 pending completion of their work-up.  Please see the next provider's transition of care note for the remainder of the patient's care.     Procedures   Procedures    Patient seen and discussed with ED attending physician.    Julio Brown DO  Emergency Medicine     Julio Brown DO  Resident  10/19/24 2023       Julio Brown DO  Resident  10/19/24 2032    I performed a history and physical examination of the patient and discussed his management with the resident physician.  I agree with the history, physical, assessment, and plan of care, with the following exceptions:   Patient CT imaging did not reveal any sign of acute intra-abdominal retroperitoneal or pelvic pathology.  Suspect lumbar strain.  He is neurologically intact with no sign of trauma, bowel or bladder incontinence, saddle anesthesia or lower extremity weakness numbness or tingling.  Cranial nerves II through XII intact.  Strength 5 out of 5 in all 4 extremities.  Sensation intact to light touch in all 4 extremities.  No dysdiadochokinesia, no dysmetria.  Gait is narrow and stable.  Patient advised on supportive measures at home including heat pack, use of NSAIDs, Tylenol.  Patient advised to follow-up with his PCP as soon as possible but ideally within the next  72 hours for repeat evaluation.  Prescriptions given for lidocaine patch and Robaxin twice daily dosing.  May utilize this as needed.  Advised of any heavy lifting until cleared by PCP.  Discharged in stable condition with return precautions explained at bedside.    I was present for key and critical portions of the following procedures: None  Time Spent in Critical Care of the patient: None  Time spent in discussions with the patient and family: 30    DO Cl Weston DO  10/21/24 1597

## 2024-10-20 NOTE — DISCHARGE INSTRUCTIONS
We have given you prescriptions for lidocaine patches as well as Robaxin to help with your symptoms at home.  It is very important that you follow-up with your primary care provider for ongoing management of your pain and symptoms.  Please do not hesitate to return to the ER or seek immediate medical attention if you begin to have any of the following symptoms: Numbness in your groin, numbness and weakness in your lower extremities, inability to control your lower extremities, loss of control of your bladder or bowels, or any new or concerning symptoms.

## 2025-01-13 ENCOUNTER — OFFICE VISIT (OUTPATIENT)
Dept: CARDIOLOGY | Facility: CLINIC | Age: 40
End: 2025-01-13
Payer: COMMERCIAL

## 2025-01-13 VITALS
DIASTOLIC BLOOD PRESSURE: 81 MMHG | BODY MASS INDEX: 43.02 KG/M2 | WEIGHT: 300.5 LBS | HEART RATE: 72 BPM | HEIGHT: 70 IN | SYSTOLIC BLOOD PRESSURE: 123 MMHG | OXYGEN SATURATION: 96 %

## 2025-01-13 DIAGNOSIS — Z86.718 HISTORY OF DVT (DEEP VEIN THROMBOSIS): ICD-10-CM

## 2025-01-13 DIAGNOSIS — I83.813 VARICOSE VEINS OF BOTH LOWER EXTREMITIES WITH PAIN: ICD-10-CM

## 2025-01-13 DIAGNOSIS — D72.829 LEUKOCYTOSIS, UNSPECIFIED TYPE: ICD-10-CM

## 2025-01-13 DIAGNOSIS — I87.2 CHRONIC VENOUS INSUFFICIENCY: Primary | ICD-10-CM

## 2025-01-13 PROCEDURE — 1036F TOBACCO NON-USER: CPT | Performed by: INTERNAL MEDICINE

## 2025-01-13 PROCEDURE — 3008F BODY MASS INDEX DOCD: CPT | Performed by: INTERNAL MEDICINE

## 2025-01-13 PROCEDURE — 99214 OFFICE O/P EST MOD 30 MIN: CPT | Mod: GC | Performed by: INTERNAL MEDICINE

## 2025-01-13 PROCEDURE — 99204 OFFICE O/P NEW MOD 45 MIN: CPT | Performed by: INTERNAL MEDICINE

## 2025-01-13 ASSESSMENT — ENCOUNTER SYMPTOMS
LOSS OF SENSATION IN FEET: 0
DEPRESSION: 0
OCCASIONAL FEELINGS OF UNSTEADINESS: 0

## 2025-01-13 ASSESSMENT — PATIENT HEALTH QUESTIONNAIRE - PHQ9
2. FEELING DOWN, DEPRESSED OR HOPELESS: NOT AT ALL
SUM OF ALL RESPONSES TO PHQ9 QUESTIONS 1 AND 2: 0
1. LITTLE INTEREST OR PLEASURE IN DOING THINGS: NOT AT ALL

## 2025-01-13 ASSESSMENT — PAIN SCALES - GENERAL: PAINLEVEL_OUTOF10: 0-NO PAIN

## 2025-01-13 ASSESSMENT — COLUMBIA-SUICIDE SEVERITY RATING SCALE - C-SSRS
1. IN THE PAST MONTH, HAVE YOU WISHED YOU WERE DEAD OR WISHED YOU COULD GO TO SLEEP AND NOT WAKE UP?: NO
6. HAVE YOU EVER DONE ANYTHING, STARTED TO DO ANYTHING, OR PREPARED TO DO ANYTHING TO END YOUR LIFE?: NO
2. HAVE YOU ACTUALLY HAD ANY THOUGHTS OF KILLING YOURSELF?: NO

## 2025-01-13 NOTE — PATIENT INSTRUCTIONS
-- Use compression stocking during the day, do not use the same pair for more than 4-6 months   -- Meticulous skin care:              Wash daily with mild soap and warm water              Daily use of emollient moisturizer (ex. Dori, Aquaphor, Eucerin)  -- Legs should be elevated whenever you are sleeping/lying in bed or sitting with 1-2 pillows below the legs in order to try to keep them above the level of heart  -- Exercise encouraged (at least 30 mins, 5 times a week, cyclic dependent thrusting of legs recommended to activate calf muscle pump)  -- Please try to follow a low salt diet  -- Weight loss highly encouraged  -- Close monitoring for signs of infection/wounds     -- Follow up in 3 months, earlier if needed

## 2025-01-13 NOTE — PROGRESS NOTES
Chief complaint: VTE - VI     Subjective   Pt is accompanied by his wife, he presents to establish care referred from his PCP for bilateral lower extremity varicose veins and history of DVT.    He has a history of unprovoked LLE SVT (GSV on venous duplex 10/12/20) for which he was anticoagulated on xarelto for 3 months. He then was diagnosed with an unprovoked calf vein DVT (04/10/22 venous duplex: posterior tibial vein, no proximal DVT) and resumed xarelto - has not had any recurrent episodes or symptoms since. He described prior episodes of a focal tender knot associated with warmth sounds more consistent with recurrent thrombophlebitis. In both instances he underwent CT that ruled out pulmonary embolism.     He does have LE heaviness at the end of the day, denies feeling edema in his extremities. He has never tried compression or elevation. He does have a few varicose veins at his bilateral ankles, calves and behind the knees that occasionally cause discomfort.     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 Stays very busy with 5 children at home.     He did smoke cigarettes >1 ppd for two years but then quit twenty years ago.   His mother has varicose veins. No other know family history of coagulopathy or VTE.      Review of Systems  As noted above   LE heaviness at the end of the day  varicose veins at his bilateral ankles, calves and behind the knees that occasionally cause discomfort  GERD   Anxiety   No other complaints today     Past Medical History:   Diagnosis Date    Acute thromboembolism of deep veins of left lower extremity (Multi) 04/18/2023    Closed fracture of tibial plateau with routine healing, subsequent encounter 04/18/2023    History of ankle surgery 09/13/2024    History of surgical procedure 09/13/2024    Other specified health status 06/14/2016    Known  health problems: none    Status post vasectomy 04/18/2023    Umbilical hernia 09/13/2024     Past Surgical History:   Procedure Laterality Date    HERNIA REPAIR  06/14/2016    Inguinal Hernia Repair    LEG SURGERY  07/02/2015    Treatment Of Lower Leg Fracture     Social History     Socioeconomic History    Marital status:    Tobacco Use    Smoking status: Former     Current packs/day: 1.00     Average packs/day: 1 pack/day for 15.0 years (15.0 ttl pk-yrs)     Types: Cigarettes     Start date: 2010    Smokeless tobacco: Never   Vaping Use    Vaping status: Never Used   Substance and Sexual Activity    Drug use: Never     Social Drivers of Health     Financial Resource Strain: Low Risk  (9/13/2024)    Overall Financial Resource Strain (CARDIA)     Difficulty of Paying Living Expenses: Not very hard   Food Insecurity: No Food Insecurity (9/13/2024)    Hunger Vital Sign     Worried About Running Out of Food in the Last Year: Never true     Ran Out of Food in the Last Year: Never true   Transportation Needs: No Transportation Needs (9/13/2024)    PRAPARE - Transportation     Lack of Transportation (Medical): No     Lack of Transportation (Non-Medical): No   Physical Activity: Sufficiently Active (9/13/2024)    Exercise Vital Sign     Days of Exercise per Week: 5 days     Minutes of Exercise per Session: 60 min   Stress: Stress Concern Present (9/13/2024)    [employer] [place] [employer] - Occupational Stress Questionnaire     Feeling of Stress : To some extent   Social Connections: Moderately Isolated (9/13/2024)    Social Connection and Isolation Panel [NHANES]     Frequency of Communication with Friends and Family: More than three times a week     Frequency of Social Gatherings with Friends and Family: Once a week     Attends Sikhism Services: Never     Active Member of Clubs or Organizations: No     Attends Club or Organization Meetings: Never     Marital Status:    Intimate Partner  "Violence: Not At Risk (9/13/2024)    Humiliation, Afraid, Rape, and Kick questionnaire     Fear of Current or Ex-Partner: No     Emotionally Abused: No     Physically Abused: No     Sexually Abused: No   Housing Stability: Unknown (9/13/2024)    Housing Stability Vital Sign     Unable to Pay for Housing in the Last Year: No     Homeless in the Last Year: No      Family History   Problem Relation Name Age of Onset    No Known Problems Mother      No Known Problems Father        No Known Allergies    Objective   Physical Exam  /81 (BP Location: Left arm, Patient Position: Sitting, BP Cuff Size: Large adult long)   Pulse 72   Ht 1.778 m (5' 10\")   Wt 136 kg (300 lb 8 oz)   BMI 43.12 kg/m²      General:  In no acute distress, overweight  Neuro: alert and oriented x3  CV:  RRR  Lungs: CTA bilaterally  Abd:  Soft, non-tender   Psych:  Appropriate affect  Upper extremities: No swelling, +2 radial   Lower extremities: No edema.  +2 DP/PT  Skin:  +tattoos, No open ulcers. Few isolated varicosities at the bilateral ankles, posterior calf and popliteal fossa increase on standing but without focal tenderness. No chronic venous stasis changes     Medications   Current Outpatient Medications   Medication Instructions    FLUoxetine (PROzac) 40 mg capsule 1 capsule, oral, Daily (0630)    lidocaine (Lidoderm) 5 % patch 1 patch, transdermal, Daily, Remove & discard patch within 12 hours or as directed by MD.    loratadine (Claritin) 10 mg tablet 1 tablet, oral, Daily    methocarbamol (ROBAXIN) 500 mg, oral, 2 times daily    neomycin-polymyxin-HC (Cortisporin) 3.5-10,000-1 mg/mL-unit/mL-% otic suspension otic (ear), 4 times daily    pantoprazole (PROTONIX) 40 mg, oral, Daily    rivaroxaban (XARELTO) 20 mg, oral, Daily       Lab Review   Recent Labs     10/19/24  2036 09/13/24  1217 04/25/22  1218 04/22/22  0231 01/29/20  0651    138 141 139 141   K 4.0 4.5 4.2 3.9 4.4    103 108* 107 109*   CO2 27 24 23 23 25 "   ANIONGAP 11 16 14 13 11   BUN 18 23 14 22 18   CREATININE 0.96 1.08 0.83 0.85 0.91   EGFR >90 >90  --   --   --      Recent Labs     10/19/24  2036 09/13/24  1217 04/25/22  1218 01/29/20  0651   ALBUMIN 4.0 4.8 4.2 3.9   ALKPHOS 52 54 60 70   ALT 23 33 25 29   AST 16 26 18 18   BILITOT 0.7 1.3* 1.2 0.5   LIPASE 30  --   --   --      Recent Labs     10/19/24  2036 09/13/24  1217 04/25/22  1218 04/22/22  0231 01/29/20  0651   WBC 11.8* 11.7* 11.1 13.1* 10.3   HGB 14.9 15.9 16.0 15.6 14.5   HCT 45.6 48.8 48.0 46.3 45.0    248 226 215 229   MCV 89 90 88 87 89     Recent Labs     07/11/21  2341   DDIMERVTE 440     PTT - No results in last year.    Recent Labs     09/13/24  1217   CHOL 171   HDL 54.2   TRIG 62     Lab Results   Component Value Date    HGBA1C 5.4 09/21/2023     Lab Results   Component Value Date    TSH 0.56 09/13/2024     Component 4/25/2022   Anticardiolipin IgG 3.6    Anticardiolipin IgA 2.2    Anticardiolipin IgM 0.3    Beta-2 Glyco 1 IgG 3.2    Beta-2 Glyco 1 IgA 2.0    Beta 2 Glyco 1 IgM 0.4      PT gene, FVL, Protein C/S AG and activity, anti-thrombin activity- Negative     Imaging  CT A/P 10/19/24  No acute abdominal or pelvic process     CTPE 4/22/22  No pulmonary embolism or acute cardiopulmonary process.     LLE DVT US 4/10/22  Thrombus within the posterior left tibial vein.     LLE DVT US 10/12/20   Right Lower Venous: Right common femoral vein is negative for deep vein thrombus.  Left Lower Venous: No evidence of acute deep vein thrombus visualized in the left lower extremity. Cannot rule out thrombus in non-visualized peroneal vein due to body habitus and swelling. There is acute non-occlusive thrombosis visualized in the mid calf GSV and distal calf GSV.    Assessment/Plan   Ernie Porter is a 39 y.o. male with PMH of GERD, obesity, ?CHELY, anxiety, hernia repair and former smoker. He is overall doing well today and minimally symptomatic.     VTE related history   _ 10/2020 left Calf  GSV SVT. Unprovoked. Rx Xarelto x 3 months  _ 4/2022 left PTV DVT. Unprovoked. Rx. 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 Adler, with plans for lifelong AC     CVI   _ As suggested by history and exam   _ FH: Mom with CVI as well     Leukocytosis, mild   _ noted on labs 10/19/24, 09/13/24, 04/22/22  _ Denny-2 ordered, if negative will reach out to his Hematologist Dr. Adler regarding this    Imaging reports and labs listed above reviewed     Plan   - Continue Xarelto 20 mg every day with diet, monitor for bleeding   - Duration of AC: As long as tolerated in setting of recurrent unprovoked VTE   - obtain venous insufficiency study LLE  - JAK2 testing ordered  - encouraged compression 20-30 mmHg, elevation daily  - continue exercise daily   - Monitor for complications discussed   - Rest as detailed in the patient's instructions       Seen, examined and discussed with Dr. Ernie Bailey MD  Vascular Surgery Fellow    I saw and evaluated the patient. I personally obtained the key and critical portions of the history and physical exam or was physically present for key and critical portions performed by the resident/fellow. I reviewed the resident/fellow's documentation and discussed the patient with the resident/fellow. I agree with the resident/fellow's medical decision making as documented in the note.    Arlin Klein MD

## 2025-01-21 ENCOUNTER — HOSPITAL ENCOUNTER (OUTPATIENT)
Dept: VASCULAR MEDICINE | Facility: CLINIC | Age: 40
Discharge: HOME | End: 2025-01-21
Payer: COMMERCIAL

## 2025-01-21 DIAGNOSIS — D72.829 LEUKOCYTOSIS, UNSPECIFIED TYPE: ICD-10-CM

## 2025-01-21 DIAGNOSIS — I87.2 CHRONIC VENOUS INSUFFICIENCY: ICD-10-CM

## 2025-01-21 DIAGNOSIS — I83.813 VARICOSE VEINS OF BOTH LOWER EXTREMITIES WITH PAIN: ICD-10-CM

## 2025-01-21 DIAGNOSIS — Z86.718 HISTORY OF DVT (DEEP VEIN THROMBOSIS): ICD-10-CM

## 2025-01-21 PROCEDURE — 93971 EXTREMITY STUDY: CPT | Performed by: SURGERY

## 2025-01-21 PROCEDURE — 93971 EXTREMITY STUDY: CPT

## 2025-02-04 ENCOUNTER — HOSPITAL ENCOUNTER (EMERGENCY)
Facility: HOSPITAL | Age: 40
Discharge: HOME | End: 2025-02-04
Attending: STUDENT IN AN ORGANIZED HEALTH CARE EDUCATION/TRAINING PROGRAM
Payer: COMMERCIAL

## 2025-02-04 ENCOUNTER — APPOINTMENT (OUTPATIENT)
Dept: RADIOLOGY | Facility: HOSPITAL | Age: 40
End: 2025-02-04
Payer: COMMERCIAL

## 2025-02-04 VITALS
SYSTOLIC BLOOD PRESSURE: 122 MMHG | OXYGEN SATURATION: 96 % | WEIGHT: 290 LBS | TEMPERATURE: 97.2 F | RESPIRATION RATE: 18 BRPM | HEART RATE: 74 BPM | BODY MASS INDEX: 41.52 KG/M2 | HEIGHT: 70 IN | DIASTOLIC BLOOD PRESSURE: 78 MMHG
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
## 2025-02-04 PROCEDURE — 2500000001 HC RX 250 WO HCPCS SELF ADMINISTERED DRUGS (ALT 637 FOR MEDICARE OP)

## 2025-02-04 PROCEDURE — 73564 X-RAY EXAM KNEE 4 OR MORE: CPT | Mod: RT

## 2025-02-04 PROCEDURE — 73610 X-RAY EXAM OF ANKLE: CPT | Mod: RIGHT SIDE | Performed by: RADIOLOGY

## 2025-02-04 PROCEDURE — 73590 X-RAY EXAM OF LOWER LEG: CPT | Mod: RIGHT SIDE | Performed by: RADIOLOGY

## 2025-02-04 PROCEDURE — 73610 X-RAY EXAM OF ANKLE: CPT | Mod: RT

## 2025-02-04 PROCEDURE — 73564 X-RAY EXAM KNEE 4 OR MORE: CPT | Mod: RIGHT SIDE | Performed by: RADIOLOGY

## 2025-02-04 PROCEDURE — 73630 X-RAY EXAM OF FOOT: CPT | Mod: RT

## 2025-02-04 PROCEDURE — 73630 X-RAY EXAM OF FOOT: CPT | Mod: RIGHT SIDE | Performed by: RADIOLOGY

## 2025-02-04 PROCEDURE — 73552 X-RAY EXAM OF FEMUR 2/>: CPT | Mod: RT

## 2025-02-04 PROCEDURE — 73590 X-RAY EXAM OF LOWER LEG: CPT | Mod: RT

## 2025-02-04 PROCEDURE — 99284 EMERGENCY DEPT VISIT MOD MDM: CPT | Performed by: STUDENT IN AN ORGANIZED HEALTH CARE EDUCATION/TRAINING PROGRAM

## 2025-02-04 PROCEDURE — 73552 X-RAY EXAM OF FEMUR 2/>: CPT | Mod: RIGHT SIDE | Performed by: RADIOLOGY

## 2025-02-04 RX ORDER — ACETAMINOPHEN 325 MG/1
650 TABLET ORAL ONCE
Status: COMPLETED | OUTPATIENT
Start: 2025-02-04 | End: 2025-02-04

## 2025-02-04 RX ORDER — CYCLOBENZAPRINE HCL 5 MG
5 TABLET ORAL 3 TIMES DAILY
Qty: 20 TABLET | Refills: 0 | Status: SHIPPED | OUTPATIENT
Start: 2025-02-04 | End: 2025-02-11

## 2025-02-04 RX ORDER — CYCLOBENZAPRINE HCL 10 MG
5 TABLET ORAL ONCE
Status: COMPLETED | OUTPATIENT
Start: 2025-02-04 | End: 2025-02-04

## 2025-02-04 RX ORDER — CYCLOBENZAPRINE HCL 5 MG
5 TABLET ORAL 3 TIMES DAILY
Qty: 20 TABLET | Refills: 0 | Status: SHIPPED | OUTPATIENT
Start: 2025-02-04 | End: 2025-02-04

## 2025-02-04 RX ADMIN — ACETAMINOPHEN 650 MG: 325 TABLET, FILM COATED ORAL at 10:27

## 2025-02-04 RX ADMIN — CYCLOBENZAPRINE 5 MG: 10 TABLET, FILM COATED ORAL at 10:27

## 2025-02-04 ASSESSMENT — COLUMBIA-SUICIDE SEVERITY RATING SCALE - C-SSRS
6. HAVE YOU EVER DONE ANYTHING, STARTED TO DO ANYTHING, OR PREPARED TO DO ANYTHING TO END YOUR LIFE?: NO
2. HAVE YOU ACTUALLY HAD ANY THOUGHTS OF KILLING YOURSELF?: NO
1. IN THE PAST MONTH, HAVE YOU WISHED YOU WERE DEAD OR WISHED YOU COULD GO TO SLEEP AND NOT WAKE UP?: NO

## 2025-02-04 NOTE — ED PROVIDER NOTES
Emergency Department Provider Note        History of Present Illness     History provided by: Patient  Limitations to History: None  External Records Reviewed with Brief Summary: None    HPI:  Ernie Porter is a 39 y.o. male presenting to the ED after a mechanical fall.  Patient reports he was walking on the driveway yesterday when he slipped and fell onto his right leg.  He denies any presyncopal symptoms, did not hit his head no loss of consciousness.  Patient is complaining of right leg pain distal to the mid right thigh.  He is able to ambulate, however has associated pain.  Patient is otherwise feeling well, no infectious symptoms including fevers, chills, chest pain, shortness of breath.    Physical Exam   Triage vitals:  T 36.2 °C (97.2 °F)  HR 74  /78  RR 18  O2 96 % None (Room air)    General: Awake, alert, in no acute distress  Eyes: Gaze conjugate.  No scleral icterus or injection  HENT: Normo-cephalic, atraumatic. No stridor  CV: Regular rate, regular rhythm. Radial pulses 2+ bilaterally  Resp: Breathing non-labored, speaking in full sentences.  Clear to auscultation bilaterally  GI: Soft, non-distended, non-tender. No rebound or guarding.  MSK/Extremities: No gross bony deformities.  Tenderness to palpation over the right distal thigh into the right foot.  Neurovascularly intact.  Full range of motion of right hip, knee, ankle, however associated with pain.  Skin: Warm. Appropriate color  Neuro: Alert. Oriented. Face symmetric. Speech is fluent.  Gross strength and sensation intact in b/l UE and LEs  Psych: Appropriate mood and affect    Medical Decision Making & ED Course   Medical Decision Makin y.o. male with past medical history as above, presenting to the ED with right lower extremity pain post fall.  Vital signs are stable on arrival.  On exam, no obvious deformities.  Patient does have tenderness to palpation from the right distal thigh into the right foot.  Neurovascularly  intact.  Low suspicion for fracture, however will rule out with x-ray.  Strong suspicion for muscular strain versus sprain, will provide Flexeril and Tylenol for symptomatic relief.    X-rays showing remote fractures, however nothing acute.  At this point, I think patient is appropriate for discharge.  Provided muscle relaxer for muscular pain.  Discussed return precautions, patient verbalized agreement.  Patient discharged in stable condition.    ----      Differential diagnoses considered include but are not limited to: Muscular strain, fracture,     Social Determinants of Health which Significantly Impact Care: None identified     EKG Independent Interpretation: EKG not obtained    Independent Result Review and Interpretation: Relevant laboratory and radiographic results were reviewed and independently interpreted by myself.  As necessary, they are commented on in the ED Course.    Chronic conditions affecting the patient's care: As documented above in [facility]    The patient was discussed with the following consultants/services: None    Care Considerations: As documented above in [facility]    ED Course:  Diagnoses as of 02/04/25 1140   Fall, initial encounter     Disposition   As a result of the work-up, the patient was discharged home.  he was informed of his diagnosis and instructed to come back with any concerns or worsening of condition.  he and was agreeable to the plan as discussed above.  he was given the opportunity to ask questions.  All of the patient's questions were answered.    Procedures   Procedures    This was a shared visit with an ED attending.  The patient was seen and discussed with the ED attending    Linette De La Torre DO  Emergency Medicine     Linette De La Torre DO  Resident  02/04/25 1144

## 2025-02-04 NOTE — DISCHARGE INSTRUCTIONS
You have been evaluated in the Emergency Department today for lower extremity pain. Your evaluation did not find evidence of medical conditions requiring emergent intervention at this time.  Please rest, ice, and elevate your right leg, and resume normal activities as tolerated.    We recommend you take 600mg ibuprofen every 6 hours or tylenol 650mg every 6 hours as needed for pain. If needed, you can alternate these medications so that you take one medication every 3 hours. For instance, at noon take ibuprofen, then at 3pm take tylenol, then at 6pm take ibuprofen.    Please schedule an appointment for follow up with your primary care physician this week.    Return to the Emergency Department if you experience worsening pain, numbness, tingling, change of color in your toes, or any other concerning symptoms.

## 2025-02-04 NOTE — ED TRIAGE NOTES
Patient arrives from home with complaints of right leg pain from ankle to knee after twisting his leg when falling on ice yesterday evening.

## 2025-03-10 DIAGNOSIS — K21.9 GASTROESOPHAGEAL REFLUX DISEASE, UNSPECIFIED WHETHER ESOPHAGITIS PRESENT: ICD-10-CM

## 2025-03-10 RX ORDER — PANTOPRAZOLE SODIUM 40 MG/1
40 TABLET, DELAYED RELEASE ORAL DAILY
Qty: 90 TABLET | Refills: 3 | Status: SHIPPED | OUTPATIENT
Start: 2025-03-10

## 2025-03-21 DIAGNOSIS — Z86.718 HISTORY OF DVT (DEEP VEIN THROMBOSIS): ICD-10-CM

## 2025-03-21 DIAGNOSIS — I83.813 VARICOSE VEINS OF BOTH LOWER EXTREMITIES WITH PAIN: ICD-10-CM

## 2025-03-31 PROCEDURE — 81270 JAK2 GENE: CPT

## 2025-04-01 ENCOUNTER — LAB (OUTPATIENT)
Dept: LAB | Facility: HOSPITAL | Age: 40
End: 2025-04-01
Payer: COMMERCIAL

## 2025-04-01 DIAGNOSIS — I83.813 VARICOSE VEINS OF BILATERAL LOWER EXTREMITIES WITH PAIN: ICD-10-CM

## 2025-04-01 DIAGNOSIS — I87.2 VENOUS INSUFFICIENCY (CHRONIC) (PERIPHERAL): Primary | ICD-10-CM

## 2025-04-01 DIAGNOSIS — D72.829 ELEVATED WHITE BLOOD CELL COUNT, UNSPECIFIED: ICD-10-CM

## 2025-04-01 DIAGNOSIS — Z86.718 HISTORY OF DVT (DEEP VEIN THROMBOSIS): ICD-10-CM

## 2025-04-01 DIAGNOSIS — D72.829 LEUKOCYTOSIS, UNSPECIFIED TYPE: ICD-10-CM

## 2025-04-01 DIAGNOSIS — I83.813 VARICOSE VEINS OF BOTH LOWER EXTREMITIES WITH PAIN: ICD-10-CM

## 2025-04-01 DIAGNOSIS — Z86.718 PERSONAL HISTORY OF OTHER VENOUS THROMBOSIS AND EMBOLISM: ICD-10-CM

## 2025-04-01 DIAGNOSIS — I87.2 CHRONIC VENOUS INSUFFICIENCY: ICD-10-CM

## 2025-04-08 LAB
ELECTRONICALLY SIGNED BY: NORMAL
JAK2 V617F INTERPRETATION: NORMAL
JAK2 V617F RESULT: NOT DETECTED

## 2025-04-14 ENCOUNTER — OFFICE VISIT (OUTPATIENT)
Dept: CARDIOLOGY | Facility: CLINIC | Age: 40
End: 2025-04-14
Payer: COMMERCIAL

## 2025-04-14 VITALS
HEART RATE: 89 BPM | OXYGEN SATURATION: 95 % | RESPIRATION RATE: 18 BRPM | WEIGHT: 311.7 LBS | BODY MASS INDEX: 44.72 KG/M2 | DIASTOLIC BLOOD PRESSURE: 80 MMHG | SYSTOLIC BLOOD PRESSURE: 123 MMHG

## 2025-04-14 DIAGNOSIS — Z86.718 HISTORY OF DVT (DEEP VEIN THROMBOSIS): Primary | ICD-10-CM

## 2025-04-14 DIAGNOSIS — I83.813 VARICOSE VEINS OF BOTH LOWER EXTREMITIES WITH PAIN: ICD-10-CM

## 2025-04-14 DIAGNOSIS — Z51.81 ANTICOAGULATION MANAGEMENT ENCOUNTER: ICD-10-CM

## 2025-04-14 DIAGNOSIS — Z79.01 ANTICOAGULATION MANAGEMENT ENCOUNTER: ICD-10-CM

## 2025-04-14 DIAGNOSIS — I87.2 CHRONIC VENOUS INSUFFICIENCY: ICD-10-CM

## 2025-04-14 PROCEDURE — 99214 OFFICE O/P EST MOD 30 MIN: CPT | Performed by: INTERNAL MEDICINE

## 2025-04-14 ASSESSMENT — ENCOUNTER SYMPTOMS
LOSS OF SENSATION IN FEET: 0
OCCASIONAL FEELINGS OF UNSTEADINESS: 0

## 2025-04-14 NOTE — PROGRESS NOTES
Chief complaint: VTE - VI     Subjective   Pt is accompanied by his wife. 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  Body mass index is 44.72 kg/m².- Intentionally trying to loose weight     No change in symptoms     Review of Systems  As noted above   LE heaviness at the end of the day  varicose veins at his bilateral ankles, calves and behind the knees that occasionally cause discomfort and tenderness   GERD   Intermittent CP, per pt he was told it was 2/2 GERD  Anxiety   No melena or obvious bleeding   No other complaints today     Past Medical History:   Diagnosis Date    Acute thromboembolism of deep veins of left lower extremity (Multi) 04/18/2023    Closed fracture of tibial plateau with routine healing, subsequent encounter 04/18/2023    History of ankle surgery 09/13/2024    History of surgical procedure 09/13/2024    Other specified health status 06/14/2016    Known health problems: none    Status post vasectomy 04/18/2023    Umbilical hernia 09/13/2024     Past Surgical History:   Procedure Laterality Date    HERNIA REPAIR  06/14/2016    Inguinal Hernia Repair    LEG SURGERY  07/02/2015    Treatment Of Lower Leg Fracture     Social History     Socioeconomic History    Marital status:    Tobacco Use    Smoking status: Former     Current packs/day: 1.00     Average packs/day: 1 pack/day for 15.3 years (15.3 ttl pk-yrs)     Types: Cigarettes     Start date: 2010    Smokeless tobacco: Never   Vaping Use    Vaping  status: Never Used   Substance and Sexual Activity    Alcohol use: Not Currently    Drug use: Yes     Frequency: 14.0 times per week     Types: Marijuana     Social Drivers of Health     Financial Resource Strain: Low Risk  (9/13/2024)    Overall Financial Resource Strain (CARDIA)     Difficulty of Paying Living Expenses: Not very hard   Food Insecurity: No Food Insecurity (9/13/2024)    Hunger Vital Sign     Worried About Running Out of Food in the Last Year: Never true     Ran Out of Food in the Last Year: Never true   Transportation Needs: No Transportation Needs (9/13/2024)    PRAPARE - Transportation     Lack of Transportation (Medical): No     Lack of Transportation (Non-Medical): No   Physical Activity: Sufficiently Active (9/13/2024)    Exercise Vital Sign     Days of Exercise per Week: 5 days     Minutes of Exercise per Session: 60 min   Stress: Stress Concern Present (9/13/2024)    [employer] - Occupational Stress Questionnaire     Feeling of Stress : To some extent   Social Connections: Moderately Isolated (9/13/2024)    Social Connection and Isolation Panel [NHANES]     Frequency of Communication with Friends and Family: More than three times a week     Frequency of Social Gatherings with Friends and Family: Once a week     Attends [employer] Services: Never     Active Member of Clubs or Organizations: No     Attends Club or Organization Meetings: Never     Marital Status:    Intimate Partner Violence: Not At Risk (9/13/2024)    Humiliation, Afraid, Rape, and Kick questionnaire     Fear of Current or Ex-Partner: No     Emotionally Abused: No     Physically Abused: No     Sexually Abused: No   Housing Stability: Unknown (9/13/2024)    Housing Stability Vital Sign     Unable to Pay for Housing in the Last Year: No     Homeless in the Last Year: No      Family History   Problem Relation Name Age of Onset    No Known Problems Mother      No Known Problems Father        No  Known Allergies    Objective   Physical Exam  /80 (BP Location: Left arm)   Pulse 89   Resp 18   Wt 141 kg (311 lb 11.2 oz)   BMI 44.72 kg/m²      General:  In no acute distress, overweight  Neuro: alert and oriented x3  CV:  RRR  Lungs: CTA bilaterally  Abd:  Soft, non-tender   Psych:  Appropriate affect  Upper extremities: No swelling, +2 radial   Lower extremities: tamar trace edema.  +2 DP/PT  Skin:  +tattoos, No open ulcers. varicosities at the bilateral ankles, posterior calf and popliteal fossa increase on standing but without focal tenderness. No chronic venous stasis changes     Medications   Current Outpatient Medications   Medication Instructions    FLUoxetine (PROzac) 40 mg capsule 1 capsule, Daily (0630)    lidocaine (Lidoderm) 5 % patch 1 patch, transdermal, Daily, Remove & discard patch within 12 hours or as directed by MD.    methocarbamol (ROBAXIN) 500 mg, oral, 2 times daily    pantoprazole (PROTONIX) 40 mg, oral, Daily    rivaroxaban (XARELTO) 20 mg, oral, Daily       Lab Review   Recent Labs     10/19/24  2036 09/13/24  1217 04/25/22  1218 04/22/22  0231 01/29/20  0651    138 141 139 141   K 4.0 4.5 4.2 3.9 4.4    103 108* 107 109*   CO2 27 24 23 23 25   ANIONGAP 11 16 14 13 11   BUN 18 23 14 22 18   CREATININE 0.96 1.08 0.83 0.85 0.91   EGFR >90 >90  --   --   --      Recent Labs     10/19/24  2036 09/13/24  1217 04/25/22  1218 01/29/20  0651   ALBUMIN 4.0 4.8 4.2 3.9   ALKPHOS 52 54 60 70   ALT 23 33 25 29   AST 16 26 18 18   BILITOT 0.7 1.3* 1.2 0.5   LIPASE 30  --   --   --      Recent Labs     10/19/24  2036 09/13/24  1217 04/25/22  1218 04/22/22  0231 01/29/20  0651   WBC 11.8* 11.7* 11.1 13.1* 10.3   HGB 14.9 15.9 16.0 15.6 14.5   HCT 45.6 48.8 48.0 46.3 45.0    248 226 215 229   MCV 89 90 88 87 89     Recent Labs     07/11/21  2341   DDIMERVTE 440     PTT - No results in last year.    Recent Labs     09/13/24  1217   CHOL 171   HDL 54.2   TRIG 62     Lab Results    Component Value Date    HGBA1C 5.4 09/21/2023     Lab Results   Component Value Date    TSH 0.56 09/13/2024     Component 4/25/2022   Anticardiolipin IgG 3.6    Anticardiolipin IgA 2.2    Anticardiolipin IgM 0.3    Beta-2 Glyco 1 IgG 3.2    Beta-2 Glyco 1 IgA 2.0    Beta 2 Glyco 1 IgM 0.4      PT gene, FVL, Protein C/S AG and activity, anti-thrombin activity- Negative     Imaging  LLE VI 1/21/25  Left Lower Venous Insufficiency: Reflux is noted in the saphenofemoral junction, proximal calf great saphenous, mid calf great saphenous and distal calf great saphenous veins. There is reflux noted in the common femoral vein. Varicosed veins noted at proximal calf coming off the left GSV with reflux katherine of 3.45 sec. and diameter 7.86 mm depth 2.29 mm.  Left Lower Venous: No evidence of acute deep vein thrombus visualized in the left lower extremity.  Left Lower Vein Mapping: The left great saphenous vein and small saphenous vein appear widely patent with no evidence of thrombosis or fibrosis    CT A/P 10/19/24  No acute abdominal or pelvic process     CTPE 4/22/22  No pulmonary embolism or acute cardiopulmonary process.     LLE DVT US 4/10/22  Thrombus within the posterior left tibial vein.     LLE DVT US 10/12/20   Right Lower Venous: Right common femoral vein is negative for deep vein thrombus.  Left Lower Venous: No evidence of acute deep vein thrombus visualized in the left lower extremity. Cannot rule out thrombus in non-visualized peroneal vein due to body habitus and swelling. There is acute non-occlusive thrombosis visualized in the mid calf GSV and distal calf GSV.    Imaging reports and labs listed above reviewed     Assessment/Plan   Ernie Porter is a 39 y.o. 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 Rx Xarelto x 3 months  _ 4/2022 left PTV  DVT. Unprovoked. Rx. 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  Seen by Hematology, Dr. Adler, with plans for lifelong AC     CVI   _ As suggested by history and exam   _ FH: Mom with CVI as well   _ LLE VI 1/21/25 SFJ- GSV- CFV - Calf Varicose veins reflux     Leukocytosis, mild   _ noted on labs 10/19/24, 09/13/24, 04/22/22. Continue following with his Hematologist Dr. Adler regarding this    Plan   -- Continue Xarelto 20 mg every day with diet, monitor for bleeding, fall precautions    -- Duration of AC: As long as tolerated in setting of recurrent unprovoked VTE   -- Would favor maximizing conservative measures for CVI prior to referral for venous intervention evaluation  -- Again encouraged knee high compression stockings, 20-30 mmHg during the day  -- Leg elevation when sitting/sleeping and continue exercise daily   -- Monitor for complications discussed   -- Rest as detailed in the patient's instructions     Arlin Klein MD

## 2025-04-15 ENCOUNTER — TELEPHONE (OUTPATIENT)
Dept: CARDIOLOGY | Facility: CLINIC | Age: 40
End: 2025-04-15
Payer: COMMERCIAL

## 2025-04-15 LAB
ANION GAP SERPL CALCULATED.4IONS-SCNC: 11 MMOL/L (CALC) (ref 7–17)
BUN SERPL-MCNC: 19 MG/DL (ref 7–25)
BUN/CREAT SERPL: ABNORMAL (CALC) (ref 6–22)
CALCIUM SERPL-MCNC: 9.3 MG/DL (ref 8.6–10.3)
CHLORIDE SERPL-SCNC: 105 MMOL/L (ref 98–110)
CO2 SERPL-SCNC: 25 MMOL/L (ref 20–32)
CREAT SERPL-MCNC: 0.95 MG/DL (ref 0.6–1.26)
EGFRCR SERPLBLD CKD-EPI 2021: 106 ML/MIN/1.73M2
ERYTHROCYTE [DISTWIDTH] IN BLOOD BY AUTOMATED COUNT: 12.7 % (ref 11–15)
GLUCOSE SERPL-MCNC: 117 MG/DL (ref 65–99)
HCT VFR BLD AUTO: 44.9 % (ref 38.5–50)
HGB BLD-MCNC: 15 G/DL (ref 13.2–17.1)
MCH RBC QN AUTO: 29.8 PG (ref 27–33)
MCHC RBC AUTO-ENTMCNC: 33.4 G/DL (ref 32–36)
MCV RBC AUTO: 89.3 FL (ref 80–100)
PLATELET # BLD AUTO: 218 THOUSAND/UL (ref 140–400)
PMV BLD REES-ECKER: 11.1 FL (ref 7.5–12.5)
POTASSIUM SERPL-SCNC: 4.3 MMOL/L (ref 3.5–5.3)
RBC # BLD AUTO: 5.03 MILLION/UL (ref 4.2–5.8)
SODIUM SERPL-SCNC: 141 MMOL/L (ref 135–146)
WBC # BLD AUTO: 10.3 THOUSAND/UL (ref 3.8–10.8)

## 2025-04-15 NOTE — TELEPHONE ENCOUNTER
Called pt to review results. Advised there was no significant abnormality, however blood glucose was slightly elevated, recommended follow up with PCP. Pt verbalized understanding of conversation and comfortable with plan.

## 2025-04-15 NOTE — TELEPHONE ENCOUNTER
----- Message from Arlin Klein sent at 4/15/2025  8:53 AM EDT -----  Labs completed with no significant abnormality noted except for slightly elevated blood glucose, please follow-up with PCP in that regards  Thanks

## 2025-09-08 ENCOUNTER — APPOINTMENT (OUTPATIENT)
Dept: CARDIOLOGY | Facility: CLINIC | Age: 40
End: 2025-09-08
Payer: COMMERCIAL